# Patient Record
Sex: FEMALE
[De-identification: names, ages, dates, MRNs, and addresses within clinical notes are randomized per-mention and may not be internally consistent; named-entity substitution may affect disease eponyms.]

---

## 2021-01-04 PROBLEM — Z00.00 ENCOUNTER FOR PREVENTIVE HEALTH EXAMINATION: Status: ACTIVE | Noted: 2021-01-04

## 2021-04-16 ENCOUNTER — APPOINTMENT (OUTPATIENT)
Dept: GASTROENTEROLOGY | Facility: CLINIC | Age: 31
End: 2021-04-16
Payer: COMMERCIAL

## 2021-04-16 PROCEDURE — 99204 OFFICE O/P NEW MOD 45 MIN: CPT | Mod: 95

## 2021-04-21 NOTE — ASSESSMENT
[FreeTextEntry1] : 31F with intermittent episodes of bloating, loose stool, abdominal pain\par - obtain outside records\par - check SIBO\par - for EPISODIC GI symptoms consider: FMF, sclerosing mesenteritis, HAE, AIP, MCAS, endometriosis, POTS, MALS/MALS, dysmotility, etc

## 2021-04-21 NOTE — HISTORY OF PRESENT ILLNESS
[de-identified] : 31F with shellfish allergy no other PMHX presents for GI symptoms\par TELEMED REQ FOR PANDEMIC\par dec 2019 went to ER for R stomach pain to r/o appendicitis but showed constipation told to eat fiber. 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\par then went gluten free\par then new UPPER BURNING PAIN when hungry\par then developed RASH legs/knees\par went to see rheumatologist who said it was autoimmune but no specific condition found and told to check for IBD\par egd/cscope showed no IBD \par then sxs went away\par then DEC 2020 loose bowels \par #1 SPORADIC GI SYMPTOMS (3 days of symptoms, weeks off)\par no fevers brain fog, other EIMs\par + BLOATING\par  no hx gi infection, no antibiotic use (last used 2018), 2017 started OCPs\par FHX- colon cancer in father's side , no crohns \par Meds- CULTURELLE, d3 1000 IU, [id number] ,ca/mg 400, vit C 500mh \par no smoking drinking drugs\par  for [employer]\par not assoc with stress \par NO EIMs\par \par

## 2021-05-13 ENCOUNTER — TRANSCRIPTION ENCOUNTER (OUTPATIENT)
Age: 31
End: 2021-05-13